# Patient Record
Sex: MALE | Race: WHITE | NOT HISPANIC OR LATINO | Employment: OTHER | RURAL
[De-identification: names, ages, dates, MRNs, and addresses within clinical notes are randomized per-mention and may not be internally consistent; named-entity substitution may affect disease eponyms.]

---

## 2023-08-04 ENCOUNTER — HOSPITAL ENCOUNTER (EMERGENCY)
Facility: HOSPITAL | Age: 73
Discharge: HOME | End: 2023-08-04
Attending: EMERGENCY MEDICINE
Payer: MEDICARE

## 2023-08-04 VITALS
WEIGHT: 200 LBS | HEIGHT: 74 IN | DIASTOLIC BLOOD PRESSURE: 84 MMHG | TEMPERATURE: 98 F | RESPIRATION RATE: 38 BRPM | OXYGEN SATURATION: 96 % | BODY MASS INDEX: 25.67 KG/M2 | SYSTOLIC BLOOD PRESSURE: 129 MMHG | HEART RATE: 71 BPM
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
## 2023-08-04 DIAGNOSIS — M25.511 RIGHT SHOULDER PAIN: ICD-10-CM

## 2023-08-04 DIAGNOSIS — S43.014A ANTERIOR DISLOCATION OF RIGHT SHOULDER, INITIAL ENCOUNTER: Primary | ICD-10-CM

## 2023-08-04 PROCEDURE — 96374 THER/PROPH/DIAG INJ IV PUSH: CPT

## 2023-08-04 PROCEDURE — 96361 HYDRATE IV INFUSION ADD-ON: CPT

## 2023-08-04 PROCEDURE — 25000003 PHARM REV CODE 250: Performed by: EMERGENCY MEDICINE

## 2023-08-04 PROCEDURE — 99284 PR EMERGENCY DEPT VISIT,LEVEL IV: ICD-10-PCS | Mod: ,,, | Performed by: EMERGENCY MEDICINE

## 2023-08-04 PROCEDURE — 63600175 PHARM REV CODE 636 W HCPCS: Performed by: EMERGENCY MEDICINE

## 2023-08-04 PROCEDURE — 99284 EMERGENCY DEPT VISIT MOD MDM: CPT | Mod: 25

## 2023-08-04 PROCEDURE — 99284 EMERGENCY DEPT VISIT MOD MDM: CPT | Mod: ,,, | Performed by: EMERGENCY MEDICINE

## 2023-08-04 PROCEDURE — 96375 TX/PRO/DX INJ NEW DRUG ADDON: CPT

## 2023-08-04 RX ORDER — GABAPENTIN 800 MG/1
800 TABLET ORAL
COMMUNITY

## 2023-08-04 RX ORDER — FENTANYL CITRATE 50 UG/ML
INJECTION, SOLUTION INTRAMUSCULAR; INTRAVENOUS
Status: DISCONTINUED
Start: 2023-08-04 | End: 2023-08-04 | Stop reason: HOSPADM

## 2023-08-04 RX ORDER — ONDANSETRON 2 MG/ML
4 INJECTION INTRAMUSCULAR; INTRAVENOUS
Status: COMPLETED | OUTPATIENT
Start: 2023-08-04 | End: 2023-08-04

## 2023-08-04 RX ORDER — CARBIDOPA AND LEVODOPA 25; 100 MG/1; MG/1
2 TABLET ORAL
COMMUNITY

## 2023-08-04 RX ORDER — PROPOFOL 10 MG/ML
50 VIAL (ML) INTRAVENOUS ONCE
Status: DISCONTINUED | OUTPATIENT
Start: 2023-08-04 | End: 2023-08-04

## 2023-08-04 RX ORDER — FENTANYL CITRATE 50 UG/ML
50 INJECTION, SOLUTION INTRAMUSCULAR; INTRAVENOUS
Status: COMPLETED | OUTPATIENT
Start: 2023-08-04 | End: 2023-08-04

## 2023-08-04 RX ORDER — FENTANYL CITRATE 50 UG/ML
INJECTION, SOLUTION INTRAMUSCULAR; INTRAVENOUS CODE/TRAUMA/SEDATION MEDICATION
Status: COMPLETED | OUTPATIENT
Start: 2023-08-04 | End: 2023-08-04

## 2023-08-04 RX ORDER — MIDAZOLAM HYDROCHLORIDE 5 MG/ML
INJECTION INTRAMUSCULAR; INTRAVENOUS CODE/TRAUMA/SEDATION MEDICATION
Status: COMPLETED | OUTPATIENT
Start: 2023-08-04 | End: 2023-08-04

## 2023-08-04 RX ORDER — SODIUM CHLORIDE 9 MG/ML
INJECTION, SOLUTION INTRAVENOUS
Status: COMPLETED | OUTPATIENT
Start: 2023-08-04 | End: 2023-08-04

## 2023-08-04 RX ORDER — ROPINIROLE 1 MG/1
1 TABLET, FILM COATED ORAL
COMMUNITY
Start: 2023-07-14 | End: 2023-08-13

## 2023-08-04 RX ORDER — CIPROFLOXACIN 500 MG/1
500 TABLET ORAL
COMMUNITY
Start: 2023-07-31 | End: 2023-08-30

## 2023-08-04 RX ORDER — PREDNISONE 10 MG/1
1 TABLET ORAL EVERY MORNING
COMMUNITY

## 2023-08-04 RX ORDER — MIDAZOLAM HYDROCHLORIDE 1 MG/ML
2.5 INJECTION INTRAMUSCULAR; INTRAVENOUS
Status: DISCONTINUED | OUTPATIENT
Start: 2023-08-04 | End: 2023-08-04

## 2023-08-04 RX ORDER — MIDAZOLAM HYDROCHLORIDE 1 MG/ML
INJECTION INTRAMUSCULAR; INTRAVENOUS
Status: DISCONTINUED
Start: 2023-08-04 | End: 2023-08-04 | Stop reason: WASHOUT

## 2023-08-04 RX ADMIN — FENTANYL CITRATE 25 MCG: 50 INJECTION INTRAMUSCULAR; INTRAVENOUS at 12:08

## 2023-08-04 RX ADMIN — FENTANYL CITRATE 45 MCG: 50 INJECTION INTRAMUSCULAR; INTRAVENOUS at 12:08

## 2023-08-04 RX ADMIN — SODIUM CHLORIDE 1000 ML: 9 INJECTION, SOLUTION INTRAVENOUS at 12:08

## 2023-08-04 RX ADMIN — FENTANYL CITRATE 50 MCG: 50 INJECTION, SOLUTION INTRAMUSCULAR; INTRAVENOUS at 12:08

## 2023-08-04 RX ADMIN — MIDAZOLAM HYDROCHLORIDE 2 MG: 5 INJECTION, SOLUTION INTRAMUSCULAR; INTRAVENOUS at 12:08

## 2023-08-04 RX ADMIN — ONDANSETRON HYDROCHLORIDE 4 MG: 2 SOLUTION INTRAMUSCULAR; INTRAVENOUS at 12:08

## 2023-08-04 NOTE — ED PROVIDER NOTES
Encounter Date: 8/4/2023       History   No chief complaint on file.    72-year-old male presents to the emergency department to be evaluated for right upper arm pain.  He tripped and fell about an hour ago and injured his right upper arm.  Denies head injury, headache, neck pain, back pain, chest pain, abdominal pain, loss of consciousness.    The history is provided by the patient.   Fall  The accident occurred 1 to 2 hours ago. Pertinent negatives include no neck pain, no back pain, no paresthesias, no paralysis, no visual change, no fever, no numbness, no abdominal pain, no bowel incontinence, no nausea, no vomiting, no hematuria, no headaches, no hearing loss, no loss of consciousness and no tingling.     Review of patient's allergies indicates:  No Known Allergies  Past Medical History:   Diagnosis Date    Parkinson's disease      No past surgical history on file.  No family history on file.     Review of Systems   Constitutional:  Negative for fever.   Gastrointestinal:  Negative for abdominal pain, bowel incontinence, nausea and vomiting.   Genitourinary:  Negative for hematuria.   Musculoskeletal:  Negative for back pain and neck pain.   Neurological:  Negative for tingling, loss of consciousness, numbness, headaches and paresthesias.   All other systems reviewed and are negative.      Physical Exam     Initial Vitals [08/04/23 1111]   BP Pulse Resp Temp SpO2   112/83 84 16 97.6 °F (36.4 °C) 95 %      MAP       --         Physical Exam    Vitals reviewed.  Constitutional: He appears well-developed and well-nourished.   HENT:   Head: Normocephalic and atraumatic.   Eyes: EOM are normal. Pupils are equal, round, and reactive to light.   Neck: Neck supple.   Cardiovascular:  Normal rate and regular rhythm.           Pulmonary/Chest: Breath sounds normal.   Abdominal: Abdomen is soft. Bowel sounds are normal. He exhibits no distension and no mass. There is no abdominal tenderness. There is no rebound and no  guarding.   Musculoskeletal:         General: Normal range of motion.      Right shoulder: Normal.      Right upper arm: Tenderness present. No swelling, edema, deformity or lacerations.      Right elbow: Normal.      Cervical back: Normal and neck supple.      Thoracic back: Normal.      Lumbar back: Normal.     Neurological: He is alert and oriented to person, place, and time. He has normal strength. GCS score is 15. GCS eye subscore is 4. GCS verbal subscore is 5. GCS motor subscore is 6.   Skin: Skin is warm and dry. Capillary refill takes less than 2 seconds.   Psychiatric: He has a normal mood and affect.         Medical Screening Exam   See Full Note    ED Course   Procedures  Labs Reviewed - No data to display       Imaging Results              X-Ray Shoulder Trauma Right (Final result)  Result time 08/04/23 13:32:27      Final result by Ignacio Corona II, MD (08/04/23 13:32:27)                   Impression:      Successful reduction of dislocation.      Electronically signed by: Ignacio Corona  Date:    08/04/2023  Time:    13:32               Narrative:    EXAMINATION:  XR SHOULDER TRAUMA 3 VIEW RIGHT    CLINICAL HISTORY:  Pain in right shoulder    COMPARISON:  4 August 2023 at 11:54    TECHNIQUE:  XR SHOULDER 2 VIEW RIGHT    FINDINGS:  Previous dislocation reduced to normal alignment.  No other definite changes                                       X-Ray Shoulder Trauma Right (Final result)  Result time 08/04/23 12:09:37      Final result by Wan Harris DO (08/04/23 12:09:37)                   Impression:      Anterior inferior dislocation of the right proximal humerus      Electronically signed by: Wan Harris  Date:    08/04/2023  Time:    12:09               Narrative:    EXAMINATION:  XR SHOULDER TRAUMA 3 VIEW RIGHT    CLINICAL HISTORY:  Pain in right shoulder    TECHNIQUE:  XR SHOULDER TRAUMA 3 VIEW RIGHT    COMPARISON:  8/4/23    FINDINGS:  Anterior inferior dislocation of the  right proximal humerus                                       X-Ray Humerus 2 View Right (Final result)  Result time 08/04/23 12:08:53      Final result by Wan Harris DO (08/04/23 12:08:53)                   Impression:      Anterior/inferior displacement of the proximal right humerus.      Electronically signed by: Wan Harris  Date:    08/04/2023  Time:    12:08               Narrative:    EXAMINATION:  XR HUMERUS 2 VIEW RIGHT    CLINICAL HISTORY:  Unspecified fall, initial encounter    TECHNIQUE:  XR HUMERUS 2 VIEW RIGHT    COMPARISON:  None    FINDINGS:  Poor positioning and motion limits evaluation of the right humerus.  The exam is also limited due to lack of orthogonal views.    No obvious acutely displaced fracture of the right humerus.    Anterior/inferior displacement of the proximal right humerus.                                       Medications   sodium chloride 0.9% bolus 1,000 mL 1,000 mL (0 mLs Intravenous Stopped 8/4/23 1346)   fentaNYL injection 50 mcg (50 mcg Intravenous Given 8/4/23 1229)   ondansetron injection 4 mg (4 mg Intravenous Given 8/4/23 1229)   0.9%  NaCl infusion (0 mL/hr Intravenous Stopped 8/4/23 1335)   midazolam (VERSED) 5 mg/mL injection (2 mg Intravenous Given 8/4/23 1245)   fentaNYL 50 mcg/mL injection (25 mcg Intravenous Given 8/4/23 1252)                 ED Course as of 08/07/23 0600   Fri Aug 04, 2023   1256 Procedure note:  Reduction of shoulder dislocation:  Patient was sedated using Versed and fentanyl after which shoulder was reduced successfully after several attempts using direct traction and abduction and external rotation.  Patient tolerated procedure well.  Reduction was confirmed by postreduction x-ray.  Patient placed in a shoulder immobilizer. [BB]      ED Course User Index  [BB] Dax England MD                Clinical Impression:   Final diagnoses:  [W19.XXXA] Fall  [M25.511] Right shoulder pain  [S43.014A] Anterior dislocation of right shoulder,  initial encounter (Primary)        ED Disposition Condition    Discharge Stable          ED Prescriptions    None       Follow-up Information       Follow up With Specialties Details Why Contact Info    Tra Waller MD Orthopedic Surgery Schedule an appointment as soon as possible for a visit  Follow up in clinic next week for recheck. [address] [facility]  [facility] [address]  [phone]               Dax England MD  08/07/23 0600

## 2023-08-04 NOTE — PROVIDER PROGRESS NOTES - EMERGENCY DEPT.
Encounter Date: 8/4/2023    ED Physician Progress Notes        X-ray shows dislocation.  Care transferred to Dr. England

## 2023-08-04 NOTE — DISCHARGE INSTRUCTIONS
Wear shoulder immobilizer at all times.  Follow up in with orthopedics next week for recheck.  Return to emergency department for any worsen further problems.

## 2023-08-04 NOTE — ED NOTES
Arm sling applied to right arm.  
Pt transferred to [facility] for reduction of right shoulder. Pt hooked up to monitor.  
XR @ bedside.  
denies

## 2023-08-11 ENCOUNTER — HOSPITAL ENCOUNTER (OUTPATIENT)
Dept: RADIOLOGY | Facility: HOSPITAL | Age: 73
Discharge: HOME | End: 2023-08-11
Attending: NURSE PRACTITIONER
Payer: MEDICARE

## 2023-08-11 ENCOUNTER — OFFICE VISIT (OUTPATIENT)
Dept: ORTHOPEDICS | Facility: CLINIC | Age: 73
End: 2023-08-11
Payer: MEDICARE

## 2023-08-11 VITALS — WEIGHT: 200 LBS | HEIGHT: 74 IN | BODY MASS INDEX: 25.67 KG/M2

## 2023-08-11 DIAGNOSIS — S43.004A SHOULDER DISLOCATION, RIGHT, INITIAL ENCOUNTER: ICD-10-CM

## 2023-08-11 DIAGNOSIS — M25.511 ACUTE PAIN OF RIGHT SHOULDER: ICD-10-CM

## 2023-08-11 DIAGNOSIS — M25.511 ACUTE PAIN OF RIGHT SHOULDER: Primary | ICD-10-CM

## 2023-08-11 PROCEDURE — 73030 X-RAY EXAM OF SHOULDER: CPT | Mod: TC,RT

## 2023-08-11 PROCEDURE — 99213 OFFICE O/P EST LOW 20 MIN: CPT | Mod: PBBFAC | Performed by: NURSE PRACTITIONER

## 2023-08-11 PROCEDURE — 73030 X-RAY EXAM OF SHOULDER: CPT | Mod: 26,RT,, | Performed by: RADIOLOGY

## 2023-08-11 PROCEDURE — 73030 XR SHOULDER COMPLETE 2 OR MORE VIEWS RIGHT: ICD-10-PCS | Mod: 26,RT,, | Performed by: RADIOLOGY

## 2023-08-11 PROCEDURE — 99203 PR OFFICE/OUTPT VISIT, NEW, LEVL III, 30-44 MIN: ICD-10-PCS | Mod: S$PBB,,, | Performed by: NURSE PRACTITIONER

## 2023-08-11 PROCEDURE — 99203 OFFICE O/P NEW LOW 30 MIN: CPT | Mod: S$PBB,,, | Performed by: NURSE PRACTITIONER

## 2023-08-11 RX ORDER — CYCLOBENZAPRINE HCL 5 MG
5 TABLET ORAL 2 TIMES DAILY PRN
Qty: 30 TABLET | Refills: 0 | Status: SHIPPED | OUTPATIENT
Start: 2023-08-11 | End: 2023-08-11

## 2023-08-11 RX ORDER — METAXALONE 800 MG/1
800 TABLET ORAL 2 TIMES DAILY PRN
Qty: 30 TABLET | Refills: 0 | Status: SHIPPED | OUTPATIENT
Start: 2023-08-11 | End: 2023-08-26

## 2023-08-11 NOTE — PROGRESS NOTES
HPI:   Abebe Kwok is a pleasant 72 y.o. patient who reports to clinic for evaluation of right shoulder pain.  Patient had a fall and was seen in the ED on August 4.  He was seen for right shoulder pain, had a right shoulder dislocation.  Dislocation was reduced in the ED.  He did have x-rays after reduction to confirm.  He does have some bruising and swelling to the shoulder.  He has been in a sling since his ER visit on the 4th.  He does have a good bit of soreness and stiffness to the arm.  He has not tried lifting much.  He does have Parkinson's.  He is accompanied by his son today..   His biggest complaint is having muscle spasms in the shoulder and upper arm.  Reports he has not had a previous l dislocation before this fall.  He has not had a dislocation since.  Injury onset and description: fall  Patient's occupation:   This is not a work related injury.   This injury has been non-responsive to conservative care. The pain is worse with repetitive use, and strenuous activity is very difficult.  his pain improves with rest.   PAST MEDICAL HISTORY:   Past Medical History:   Diagnosis Date    Parkinson's disease      PAST SURGICAL HISTORY:   History reviewed. No pertinent surgical history.  MEDICATIONS:    Current Outpatient Medications:     carbidopa-levodopa  mg (SINEMET)  mg per tablet, Take 2 tablets by mouth., Disp: , Rfl:     ciprofloxacin HCl (CIPRO) 500 MG tablet, Take 500 mg by mouth., Disp: , Rfl:     gabapentin (NEURONTIN) 800 MG tablet, Take 800 mg by mouth., Disp: , Rfl:     metaxalone (SKELAXIN) 800 MG tablet, Take 1 tablet (800 mg total) by mouth 2 (two) times daily as needed for Pain., Disp: 30 tablet, Rfl: 0    predniSONE (DELTASONE) 10 MG tablet, Take 1 tablet by mouth every morning., Disp: , Rfl:     rivaroxaban (XARELTO) 15 mg Tab, Take 1 tablet by mouth once daily., Disp: , Rfl:     rOPINIRole (REQUIP) 1 MG tablet, Take 1 mg by mouth., Disp: , Rfl:   ALLERGIES:   Review  "of patient's allergies indicates:  No Known Allergies      PHYSICAL EXAM:  VITAL SIGNS: Ht 6' 2" (1.88 m)   Wt 90.7 kg (200 lb)   BMI 25.68 kg/m²   General: Well-developed well-nourished 72 y.o. malein no acute distress;Cardiovascular: Regular rhythm by palpation of distal pulse, normal color and temperature, no concerning varicosities on symptomatic side Lungs: No labored breathing or wheezing appreciated Neuro: Alert and oriented ×3 Psychiatric: well oriented to person, place and time, demonstrates normal mood and affect Skin: No rashes, lesions or ulcers, normal temperature, turgor, and texture on uninvolved extremity    General    Constitutional: He is oriented to person, place, and time. He appears well-developed and well-nourished. No distress.   HENT:   Head: Normocephalic and atraumatic.   Nose: Nose normal.   Eyes: Pupils are equal, round, and reactive to light.   Neck: Neck supple.   Cardiovascular:  Normal rate and intact distal pulses.            Pulmonary/Chest: Effort normal. No respiratory distress.   Abdominal: Soft. He exhibits no distension. There is no abdominal tenderness.   Neurological: He is alert and oriented to person, place, and time. He has normal reflexes. No cranial nerve deficit. He exhibits normal muscle tone.   Psychiatric: He has a normal mood and affect. His behavior is normal. Judgment normal.         Right Shoulder Exam     Inspection/Observation   Swelling: present  Bruising: present  Scars: absent    Tenderness   The patient is tender to palpation of the acromioclavicular joint and biceps tendon.    Range of Motion   Active abduction:  abnormal   Passive abduction:  abnormal   Forward Flexion:  abnormal   External Rotation 0 degrees:  normal     Tests & Signs   Drop arm: negative  Active Compression Test (Electra's Sign): positive  Yergason's Test: positive  Speed's Test: positive  Jerk Test: postive    Other   Sensation: normal    Muscle Strength   Right Upper Extremity "   Supraspinatus: 4/5   Subscapularis: 4/5     Reflexes     Left Side  Left Redmond reflex: He has a good bit of bruising to the right shoulder an upper arm.    Vascular Exam     Right Pulses      Radial:                    2+      IMAGING:  X-ray Shoulder 2 or More Views Right    Result Date: 8/11/2023  EXAMINATION: XR SHOULDER COMPLETE 2 OR MORE VIEWS RIGHT CLINICAL HISTORY: Pain in right shoulder COMPARISON: 4 August 2023 TECHNIQUE: XR SHOULDER 2 VIEWS RIGHT FINDINGS: No evidence of fracture seen.  The alignment of the joints appears normal.  Mild shoulder degenerative change is present.  No soft tissue abnormality is seen.     Mild shoulder osteoarthrosis. Electronically signed by: Ignacio Corona Date:    08/11/2023 Time:    09:11    X-Ray Shoulder Trauma Right    Result Date: 8/4/2023  EXAMINATION: XR SHOULDER TRAUMA 3 VIEW RIGHT CLINICAL HISTORY: Pain in right shoulder COMPARISON: 4 August 2023 at 11:54 TECHNIQUE: XR SHOULDER 2 VIEW RIGHT FINDINGS: Previous dislocation reduced to normal alignment.  No other definite changes     Successful reduction of dislocation. Electronically signed by: Ignacio Corona Date:    08/04/2023 Time:    13:32    X-Ray Shoulder Trauma Right    Result Date: 8/4/2023  EXAMINATION: XR SHOULDER TRAUMA 3 VIEW RIGHT CLINICAL HISTORY: Pain in right shoulder TECHNIQUE: XR SHOULDER TRAUMA 3 VIEW RIGHT COMPARISON: 8/4/23 FINDINGS: Anterior inferior dislocation of the right proximal humerus     Anterior inferior dislocation of the right proximal humerus Electronically signed by: Wan Harris Date:    08/04/2023 Time:    12:09        ASSESSMENT:      ICD-10-CM ICD-9-CM   1. Acute pain of right shoulder  M25.511 719.41   2. Shoulder dislocation, right, initial encounter  S43.004A 831.00       PLAN:     -Findings and treatment options were discussed with the patient  -All questions answered  We will continue his arm sling at this time.  He does have x-rays today.  Return to clinic in 2  weeks, we will repeat x-rays.  We did put him on Skelaxin 800 mg p.o. b.i.d. as needed.    There are no Patient Instructions on file for this visit.  Orders Placed This Encounter   Procedures    X-ray Shoulder 2 or More Views Right     Standing Status:   Future     Number of Occurrences:   1     Standing Expiration Date:   8/11/2024     Order Specific Question:   May the Radiologist modify the order per protocol to meet the clinical needs of the patient?     Answer:   Yes     Order Specific Question:   Release to patient     Answer:   Immediate     Procedures

## 2023-08-11 NOTE — LETTER
August 11, 2023      [facility]  [address] [place] [id number]  Phone: [phone]  Fax: [phone]       Patient: Abebe Kwok   YOB: 1950  Date of Visit: 08/11/2023    To Whom It May Concern:    Flower Kwok  was at [facility] on 08/11/2023. The patient may return to work/school on 08/11/20232 with no restrictions. Patient was accompanied by his son. If you have any questions or concerns, or if I can be of further assistance, please do not hesitate to contact me.    Sincerely,    FRANSICO [place]

## 2023-08-21 DIAGNOSIS — M25.511 ACUTE PAIN OF RIGHT SHOULDER: Primary | ICD-10-CM

## 2023-08-21 RX ORDER — CYCLOBENZAPRINE HCL 5 MG
TABLET ORAL
Qty: 30 TABLET | Refills: 0 | OUTPATIENT
Start: 2023-08-21

## 2023-08-21 NOTE — TELEPHONE ENCOUNTER
Patient states that he has taken all his Flexeril and wants a refill. Maria A sent a prescription for Skelaxin to his pharmacy on 8/11/23. She prefers him to take this rather than the Flexeril because it will make him less sleepy. He has not picked up that medication at this time, but states he will check with the pharmacy.

## 2023-08-31 ENCOUNTER — HOSPITAL ENCOUNTER (OUTPATIENT)
Dept: RADIOLOGY | Facility: HOSPITAL | Age: 73
Discharge: HOME | End: 2023-08-31
Attending: ORTHOPAEDIC SURGERY
Payer: MEDICARE

## 2023-08-31 ENCOUNTER — OFFICE VISIT (OUTPATIENT)
Dept: ORTHOPEDICS | Facility: CLINIC | Age: 73
End: 2023-08-31
Payer: MEDICARE

## 2023-08-31 DIAGNOSIS — M25.511 ACUTE PAIN OF RIGHT SHOULDER: Primary | ICD-10-CM

## 2023-08-31 DIAGNOSIS — M25.511 ACUTE PAIN OF RIGHT SHOULDER: ICD-10-CM

## 2023-08-31 PROCEDURE — 99213 OFFICE O/P EST LOW 20 MIN: CPT | Mod: S$PBB,,, | Performed by: ORTHOPAEDIC SURGERY

## 2023-08-31 PROCEDURE — 99213 PR OFFICE/OUTPT VISIT, EST, LEVL III, 20-29 MIN: ICD-10-PCS | Mod: S$PBB,,, | Performed by: ORTHOPAEDIC SURGERY

## 2023-08-31 PROCEDURE — 73030 X-RAY EXAM OF SHOULDER: CPT | Mod: TC,RT

## 2023-08-31 PROCEDURE — 73030 X-RAY EXAM OF SHOULDER: CPT | Mod: 26,RT,, | Performed by: ORTHOPAEDIC SURGERY

## 2023-08-31 PROCEDURE — 73030 XR SHOULDER COMPLETE 2 OR MORE VIEWS RIGHT: ICD-10-PCS | Mod: 26,RT,, | Performed by: ORTHOPAEDIC SURGERY

## 2023-08-31 PROCEDURE — 99212 OFFICE O/P EST SF 10 MIN: CPT | Mod: PBBFAC | Performed by: ORTHOPAEDIC SURGERY

## 2023-08-31 NOTE — PROGRESS NOTES
CC:   Chief Complaint   Patient presents with    Right Shoulder - Injury     RT SHOULDER DISLOCATION 8/4- SAW LORE 8/11        PREVIOUS INFO:  Lore Casarez FNP   Nurse Practitioner  Specialty:  Family Medicine  Progress Notes      Signed  Encounter Date:  8/11/2023  Creation Time:  8/11/2023 11:18 AM                                                                                                                                                                                                                                                                                    HPI:   Abebe Kwok is a pleasant 72 y.o. patient who reports to clinic for evaluation of right shoulder pain.  Patient had a fall and was seen in the ED on August 4.  He was seen for right shoulder pain, had a right shoulder dislocation.  Dislocation was reduced in the ED.  He did have x-rays after reduction to confirm.  He does have some bruising and swelling to the shoulder.  He has been in a sling since his ER visit on the 4th.  He does have a good bit of soreness and stiffness to the arm.  He has not tried lifting much.  He does have Parkinson's.  He is accompanied by his son today..   His biggest complaint is having muscle spasms in the shoulder and upper arm.  Reports he has not had a previous l dislocation before this fall.  He has not had a dislocation since.  Injury onset and description: fall  Patient's occupation:   This is not a work related injury.   This injury has been non-responsive to conservative care. The pain is worse with repetitive use, and strenuous activity is very difficult.  his pain improves with rest.             HISTORY:   8/31/2023    Abebe Kwok  is a 72 y.o. patient with underlying Parkinson sustained a right shoulder dislocation on August 4th 4 weeks ago.  He was reduced by the emergency room physician  He has severe Parkinson's he is in a wheelchair really nonambulatory has  significant motion really do not think he is a candidate for MRI or surgery and he would want surgery      PAST MEDICAL HISTORY:   Past Medical History:   Diagnosis Date    Parkinson's disease           PAST SURGICAL HISTORY: No past surgical history on file.       ALLERGIES: Review of patient's allergies indicates:  No Known Allergies     MEDICATIONS :    Current Outpatient Medications:     carbidopa-levodopa  mg (SINEMET)  mg per tablet, Take 2 tablets by mouth., Disp: , Rfl:     gabapentin (NEURONTIN) 800 MG tablet, Take 800 mg by mouth., Disp: , Rfl:     predniSONE (DELTASONE) 10 MG tablet, Take 1 tablet by mouth every morning., Disp: , Rfl:     rivaroxaban (XARELTO) 15 mg Tab, Take 1 tablet by mouth once daily., Disp: , Rfl:     rOPINIRole (REQUIP) 1 MG tablet, Take 1 mg by mouth., Disp: , Rfl:      SOCIAL HISTORY:   Social History     Socioeconomic History    Marital status:    Tobacco Use    Smoking status: Never    Smokeless tobacco: Never        ROS    FAMILY HISTORY: No family history on file.       PHYSICAL EXAM: There were no vitals filed for this visit.            There is no height or weight on file to calculate BMI.     In general, this is a well-developed, well-nourished male . The patient is alert, oriented and cooperative.      HEENT:  Normocephalic, atraumatic.  Extraocular movements are intact bilaterally.  The oropharynx is benign.       NECK:  Nontender with good range of motion.      PULMONARY: Respirations are even and non-labored.       CARDIOVASCULAR: Pulses regular by peripheral palpation.       ABDOMEN:  Soft, non-tender, non-distended.        EXTREMITIES:  He is moving his arm least 90° forward flexion abduction fairly freely    Ortho Exam      RADIOGRAPHIC FINDINGS:  Right shoulder AP and transscapular lateral osteophytes present joints congruent reduced no fracture dislocation      .      IMPRESSION:  He has been in a sling for 4 weeks he is not in the sling right  now what we are going to release him see him back on a p.r.n. basis    PLAN:  As above  There are no Patient Instructions on file for this visit.      No follow-ups on file.         Denis Titus III      (Subject to voice recognition error, transcription service not allowed)

## 2023-09-18 DIAGNOSIS — M25.511 ACUTE PAIN OF RIGHT SHOULDER: Primary | ICD-10-CM

## 2023-09-19 ENCOUNTER — HOSPITAL ENCOUNTER (OUTPATIENT)
Dept: RADIOLOGY | Facility: HOSPITAL | Age: 73
Discharge: HOME | End: 2023-09-19
Attending: ORTHOPAEDIC SURGERY
Payer: MEDICARE

## 2023-09-19 ENCOUNTER — OFFICE VISIT (OUTPATIENT)
Dept: ORTHOPEDICS | Facility: CLINIC | Age: 73
End: 2023-09-19
Payer: MEDICARE

## 2023-09-19 DIAGNOSIS — M25.511 ACUTE PAIN OF RIGHT SHOULDER: ICD-10-CM

## 2023-09-19 DIAGNOSIS — Z09 FOLLOW-UP EXAMINATION, FOLLOWING OTHER SURGERY: Primary | ICD-10-CM

## 2023-09-19 PROCEDURE — 99212 OFFICE O/P EST SF 10 MIN: CPT | Mod: S$PBB,,, | Performed by: ORTHOPAEDIC SURGERY

## 2023-09-19 PROCEDURE — 99212 OFFICE O/P EST SF 10 MIN: CPT | Mod: PBBFAC | Performed by: ORTHOPAEDIC SURGERY

## 2023-09-19 PROCEDURE — 73030 X-RAY EXAM OF SHOULDER: CPT | Mod: TC,RT

## 2023-09-19 PROCEDURE — 73030 XR SHOULDER COMPLETE 2 OR MORE VIEWS RIGHT: ICD-10-PCS | Mod: 26,RT,, | Performed by: ORTHOPAEDIC SURGERY

## 2023-09-19 PROCEDURE — 73030 X-RAY EXAM OF SHOULDER: CPT | Mod: 26,RT,, | Performed by: ORTHOPAEDIC SURGERY

## 2023-09-19 PROCEDURE — 99212 PR OFFICE/OUTPT VISIT, EST, LEVL II, 10-19 MIN: ICD-10-PCS | Mod: S$PBB,,, | Performed by: ORTHOPAEDIC SURGERY

## 2023-09-19 NOTE — PROGRESS NOTES
CC:   Chief Complaint   Patient presents with    Follow-up      F/U RT SHOULDER DISLOCATION 8/4        PREVIOUS INFO:  August 11, 2023     Maria A Leida     HPI:   Abebe Kwok is a pleasant 72 y.o. patient who reports to clinic for evaluation of right shoulder pain.  Patient had a fall and was seen in the ED on August 4.  He was seen for right shoulder pain, had a right shoulder dislocation.  Dislocation was reduced in the ED.  He did have x-rays after reduction to confirm.  He does have some bruising and swelling to the shoulder.  He has been in a sling since his ER visit on the 4th.  He does have a good bit of soreness and stiffness to the arm.  He has not tried lifting much.  He does have Parkinson's.  He is accompanied by his son today..   His biggest complaint is having muscle spasms in the shoulder and upper arm.  Reports he has not had a previous l dislocation before this fall.  He has not had a dislocation since.  Injury onset and description: fall  Patient's occupation:   This is not a work related injury.   This injury has been non-responsive to conservative care. The pain is worse with repetitive use, and strenuous activity is very difficult.  his pain improves with rest.                HISTORY:   8/31/2023    Abebe Kwok  is a 72 y.o. patient with underlying Parkinson sustained a right shoulder dislocation on August 4th 4 weeks ago.  He was reduced by the emergency room physician  He has severe Parkinson's he is in a wheelchair really nonambulatory has significant motion really do not think he is a candidate for MRI or surgery and he would want surgery         HISTORY:   9/19/2023    Abebe Kwok  is a 72 y.o. status post right shoulder dislocation was reduced by the emergency room August 4, 2023,   6 weeks out patient has severe Parkinson significant amount of involuntary motions is wheelchair bound.      PAST MEDICAL HISTORY:   Past Medical History:   Diagnosis Date     Parkinson's disease           PAST SURGICAL HISTORY: No past surgical history on file.       ALLERGIES: Review of patient's allergies indicates:  No Known Allergies     MEDICATIONS :    Current Outpatient Medications:     carbidopa-levodopa  mg (SINEMET)  mg per tablet, Take 2 tablets by mouth., Disp: , Rfl:     gabapentin (NEURONTIN) 800 MG tablet, Take 800 mg by mouth., Disp: , Rfl:     predniSONE (DELTASONE) 10 MG tablet, Take 1 tablet by mouth every morning., Disp: , Rfl:     rivaroxaban (XARELTO) 15 mg Tab, Take 1 tablet by mouth once daily., Disp: , Rfl:     rOPINIRole (REQUIP) 1 MG tablet, Take 1 mg by mouth., Disp: , Rfl:      SOCIAL HISTORY:   Social History     Socioeconomic History    Marital status:    Tobacco Use    Smoking status: Never    Smokeless tobacco: Never        ROS    FAMILY HISTORY: No family history on file.       PHYSICAL EXAM: There were no vitals filed for this visit.            There is no height or weight on file to calculate BMI.     In general, this is a well-developed, well-nourished male . The patient is alert, oriented and cooperative.      HEENT:  Normocephalic, atraumatic.  Extraocular movements are intact bilaterally.  The oropharynx is benign.       NECK:  Nontender with good range of motion.      PULMONARY: Respirations are even and non-labored.       CARDIOVASCULAR: Pulses regular by peripheral palpation.       ABDOMEN:  Soft, non-tender, non-distended.        EXTREMITIES:  Patient has definitely weakness on picking up his right arm is significant amount of involuntary motions of his arms hands deformities of his fingers.    Ortho Exam      RADIOGRAPHIC FINDINGS:  Right shoulder AP transscapular lateral there is degenerative changes of the AC joint glenohumeral joints reduced no fracture or dislocation appreciated      .      IMPRESSION:  Suspect he does not rotator cuff tear he does not appear to be a she has surgical option for repair this point he  agrees him in his son we are going to see him back p.r.n.    PLAN:  P.r.n.  There are no Patient Instructions on file for this visit.      No follow-ups on file.         Denis Titus III      (Subject to voice recognition error, transcription service not allowed)

## 2024-01-21 ENCOUNTER — HOSPITAL ENCOUNTER (EMERGENCY)
Facility: HOSPITAL | Age: 74
Discharge: HOME | End: 2024-01-21
Payer: MEDICARE

## 2024-01-21 VITALS
DIASTOLIC BLOOD PRESSURE: 68 MMHG | RESPIRATION RATE: 19 BRPM | HEIGHT: 74 IN | WEIGHT: 196 LBS | OXYGEN SATURATION: 94 % | HEART RATE: 72 BPM | TEMPERATURE: 99 F | BODY MASS INDEX: 25.15 KG/M2 | SYSTOLIC BLOOD PRESSURE: 108 MMHG

## 2024-01-21 DIAGNOSIS — R53.83 FATIGUE: ICD-10-CM

## 2024-01-21 DIAGNOSIS — U07.1 COVID-19: Primary | ICD-10-CM

## 2024-01-21 DIAGNOSIS — U07.1 COVID-19 VIRUS DETECTED: ICD-10-CM

## 2024-01-21 LAB
FLUAV AG UPPER RESP QL IA.RAPID: NEGATIVE
FLUBV AG UPPER RESP QL IA.RAPID: NEGATIVE
SARS-COV-2 RDRP RESP QL NAA+PROBE: POSITIVE

## 2024-01-21 PROCEDURE — 99283 EMERGENCY DEPT VISIT LOW MDM: CPT | Mod: 25

## 2024-01-21 PROCEDURE — 87502 INFLUENZA DNA AMP PROBE: CPT | Performed by: NURSE PRACTITIONER

## 2024-01-21 PROCEDURE — 87635 SARS-COV-2 COVID-19 AMP PRB: CPT | Performed by: NURSE PRACTITIONER

## 2024-01-21 PROCEDURE — 99283 EMERGENCY DEPT VISIT LOW MDM: CPT | Mod: ,,, | Performed by: NURSE PRACTITIONER

## 2024-01-21 NOTE — DISCHARGE INSTRUCTIONS
Quarantine 5 days from symptoms onset. Drink plenty of fluids. Return to the ed with any worsening symptoms.

## 2024-01-21 NOTE — ED PROVIDER NOTES
Encounter Date: 1/21/2024       History     Chief Complaint   Patient presents with    Fatigue     Pt c/o fatigue and not being able to walk, family has had cold like symptoms.     Pt presents with fatigue and cold symptoms since Thursday. Pt is in a wheelchair and has a history of parkinsons.       Review of patient's allergies indicates:  No Known Allergies  Past Medical History:   Diagnosis Date    Parkinson's disease      No past surgical history on file.  No family history on file.  Social History     Tobacco Use    Smoking status: Never    Smokeless tobacco: Never     Review of Systems   Constitutional:  Positive for fatigue. Negative for fever.   HENT:  Positive for sinus pressure. Negative for sore throat.    Respiratory:  Positive for cough. Negative for shortness of breath.    Cardiovascular:  Negative for chest pain.   Gastrointestinal:  Negative for nausea.   Genitourinary:  Negative for dysuria.   Musculoskeletal:  Negative for back pain.   Skin:  Negative for rash.   Neurological:  Negative for weakness.   Hematological:  Does not bruise/bleed easily.   Psychiatric/Behavioral:  Negative for behavioral problems.        Physical Exam     Initial Vitals [01/21/24 1055]   BP Pulse Resp Temp SpO2   108/68 72 19 98.7 °F (37.1 °C) (!) 94 %      MAP       --         Physical Exam    Nursing note and vitals reviewed.  Constitutional: He appears well-developed.   HENT:   Right Ear: External ear normal.   Left Ear: External ear normal.   Nose: Nose normal.   Mouth/Throat: Oropharynx is clear and moist.   Eyes: Conjunctivae are normal. Pupils are equal, round, and reactive to light.   Cardiovascular:  Normal rate and regular rhythm.           Pulmonary/Chest: Breath sounds normal.     Neurological: He is alert and oriented to person, place, and time.   Psychiatric: He has a normal mood and affect. Thought content normal.         Medical Screening Exam   See Full Note    ED Course   Procedures  Labs Reviewed    SARS-COV-2 RNA AMPLIFICATION, QUAL - Abnormal; Notable for the following components:       Result Value    SARS COV-2 MOLECULAR Positive (*)     All other components within normal limits   RAPID INFLUENZA A/B - Normal          Imaging Results              X-Ray Chest 1 View (Final result)  Result time 01/21/24 11:23:18   Procedure changed from X-Ray Chest PA And Lateral     Final result by Beto Dumont DO (01/21/24 11:23:18)                   Impression:      No acute cardiopulmonary process demonstrated.    Point of Service: [facility]      Electronically signed by: Beto Dumont  Date:    01/21/2024  Time:    11:23               Narrative:    EXAMINATION:  XR CHEST 1 VIEW    CLINICAL HISTORY:  Other fatiguefatigue;    COMPARISON:  None    TECHNIQUE:  Frontal view/views of the chest.    FINDINGS:  Heart size appears within normal limits.  Chronic change of the lungs without focal consolidation, pleural effusion, or pneumothorax.  Electronic device projects over the left lung apex with electrodes extending superiorly toward the left neck base.  Mild S-shaped curvature of the spine.                                       Medications - No data to display  Medical Decision Making  Pt presents with fatigue and cold symptoms since Thursday. Pt is in a wheelchair and has a history of parkinsons.       Amount and/or Complexity of Data Reviewed  Labs:      Details: Covid is positive   Radiology: ordered.     Details: Chest xray is normal    Risk  Risk Details: Pt is discharged home in stable condition with diagnosis of covid.                                      Clinical Impression:   Final diagnoses:  [R53.83] Fatigue  [U07.1] COVID-19 (Primary)        ED Disposition Condition    Discharge Stable          ED Prescriptions    None       Follow-up Information    None          Yvette Fernandez, FRANSICO  01/21/24 9483

## 2024-03-18 ENCOUNTER — HOSPITAL ENCOUNTER (EMERGENCY)
Facility: HOSPITAL | Age: 74
Discharge: HOME | End: 2024-03-18
Payer: MEDICARE

## 2024-03-18 VITALS
TEMPERATURE: 99 F | WEIGHT: 195 LBS | SYSTOLIC BLOOD PRESSURE: 110 MMHG | BODY MASS INDEX: 26.41 KG/M2 | DIASTOLIC BLOOD PRESSURE: 62 MMHG | HEART RATE: 85 BPM | RESPIRATION RATE: 17 BRPM | OXYGEN SATURATION: 92 % | HEIGHT: 72 IN

## 2024-03-18 DIAGNOSIS — R52 PAIN: ICD-10-CM

## 2024-03-18 DIAGNOSIS — S02.2XXA CLOSED FRACTURE OF NASAL BONE, INITIAL ENCOUNTER: ICD-10-CM

## 2024-03-18 DIAGNOSIS — S01.81XA FACIAL LACERATION, INITIAL ENCOUNTER: Primary | ICD-10-CM

## 2024-03-18 PROCEDURE — 99284 EMERGENCY DEPT VISIT MOD MDM: CPT | Mod: 25,,,

## 2024-03-18 PROCEDURE — 12011 RPR F/E/E/N/L/M 2.5 CM/<: CPT | Mod: ,,,

## 2024-03-18 PROCEDURE — 99285 EMERGENCY DEPT VISIT HI MDM: CPT | Mod: 25

## 2024-03-18 PROCEDURE — 25000003 PHARM REV CODE 250

## 2024-03-18 PROCEDURE — G0390 TRAUMA RESPONS W/HOSP CRITI: HCPCS | Mod: TRAUMA2

## 2024-03-18 PROCEDURE — 90471 IMMUNIZATION ADMIN: CPT

## 2024-03-18 PROCEDURE — 90715 TDAP VACCINE 7 YRS/> IM: CPT

## 2024-03-18 PROCEDURE — 12011 RPR F/E/E/N/L/M 2.5 CM/<: CPT

## 2024-03-18 PROCEDURE — 63600175 PHARM REV CODE 636 W HCPCS

## 2024-03-18 RX ORDER — TRAMADOL HYDROCHLORIDE 50 MG/1
50 TABLET ORAL EVERY 6 HOURS PRN
COMMUNITY

## 2024-03-18 RX ORDER — TAMSULOSIN HYDROCHLORIDE 0.4 MG/1
0.8 CAPSULE ORAL DAILY
COMMUNITY

## 2024-03-18 RX ORDER — LIDOCAINE HYDROCHLORIDE 10 MG/ML
5 INJECTION, SOLUTION EPIDURAL; INFILTRATION; INTRACAUDAL; PERINEURAL
Status: COMPLETED | OUTPATIENT
Start: 2024-03-18 | End: 2024-03-18

## 2024-03-18 RX ORDER — FAMOTIDINE 20 MG/1
20 TABLET, FILM COATED ORAL DAILY
COMMUNITY

## 2024-03-18 RX ADMIN — TETANUS TOXOID, REDUCED DIPHTHERIA TOXOID AND ACELLULAR PERTUSSIS VACCINE, ADSORBED 0.5 ML: 5; 2.5; 8; 8; 2.5 SUSPENSION INTRAMUSCULAR at 04:03

## 2024-03-18 RX ADMIN — LIDOCAINE HYDROCHLORIDE 50 MG: 10 SOLUTION INTRAVENOUS at 04:03

## 2024-03-18 RX ADMIN — BACITRACIN ZINC, NEOMYCIN SULFATE , POLYMYXIN B SULFATE. 1 EACH: 400; 3.5; 5 OINTMENT TOPICAL at 04:03

## 2024-03-18 NOTE — DISCHARGE INSTRUCTIONS
Elevate left knee apply ice as needed for swelling, take Motrin or Tylenol as needed for pain, sutures to bridge of nose out in 5 days, follow up with local PCP as needed, return to the emergency department if your symptoms worsen.    The examination and treatment you have received in the Emergency Department today have been rendered on an emergency basis only and are not intended to be a substitute for an effort to provide complete medical care. You should contact your follow-up physician as it is important that you let him or her check you and report any new or remaining problems since it is impossible to recognize and treat all elements of an injury or illness in a single emergency care center visit.

## 2024-03-18 NOTE — ED PROVIDER NOTES
Encounter Date: 3/18/2024       History     Chief Complaint   Patient presents with    Fall    Facial Laceration     Patient is a 74 y/o  male with a PMHx of Parkinson's Disease presents to the ED via POV with c/o laceration to bridge of nose s/p fall. Patient is also complaining of right knee pain. Patient denies any LOC.  Bravo trauma activated due to fall on blood thinners with injuries.     The history is provided by the patient.   Fall  The accident occurred just prior to arrival. The fall occurred while walking. He fell from a height of 1 to 2 ft. He landed on Grass. The point of impact was the face. The pain is present in the face. The pain is at a severity of 6/10. He was Ambulatory at the scene. There was No entrapment after the fall. There was No drug use involved in the accident. There was No alcohol use involved in the accident. Associated symptoms include headaches. Pertinent negatives include no neck pain, no back pain, no paresthesias, no visual change, no fever, no numbness, no bowel incontinence, no nausea, no vomiting, no hearing loss and no loss of consciousness.     Review of patient's allergies indicates:  No Known Allergies  Past Medical History:   Diagnosis Date    Parkinson's disease     Presence of neurostimulator      History reviewed. No pertinent surgical history.  History reviewed. No pertinent family history.  Social History     Tobacco Use    Smoking status: Never    Smokeless tobacco: Never     Review of Systems   Constitutional:  Negative for fever.   HENT: Negative.     Eyes: Negative.    Respiratory: Negative.     Cardiovascular: Negative.    Gastrointestinal: Negative.  Negative for bowel incontinence, nausea and vomiting.   Endocrine: Negative.    Genitourinary: Negative.    Musculoskeletal:  Negative for back pain and neck pain.   Skin:  Positive for wound.        2 cm laceration noted to the bridge of nose    Neurological:  Positive for headaches. Negative for loss of  consciousness, numbness and paresthesias.       Physical Exam     Initial Vitals [03/18/24 1601]   BP Pulse Resp Temp SpO2   110/62 85 17 98.7 °F (37.1 °C) (!) 92 %      MAP       --         Physical Exam    Nursing note and vitals reviewed.  Constitutional: Vital signs are normal. He appears well-developed and well-nourished. He is cooperative. He appears ill.   HENT:   Head: Normocephalic and atraumatic.   Right Ear: Hearing, tympanic membrane, external ear and ear canal normal.   Left Ear: Hearing, tympanic membrane, external ear and ear canal normal.   Nose: Nose lacerations and sinus tenderness present. No mucosal edema, rhinorrhea, nasal deformity, septal deviation or nasal septal hematoma. No epistaxis.  No foreign bodies. Right sinus exhibits no maxillary sinus tenderness and no frontal sinus tenderness. Left sinus exhibits no maxillary sinus tenderness and no frontal sinus tenderness.       Neck: Trachea normal and phonation normal. Neck supple. No thyroid mass and no thyromegaly present.   Normal range of motion.   Full passive range of motion without pain.     Cardiovascular:  Normal rate, regular rhythm, S1 normal, S2 normal, normal heart sounds, intact distal pulses and normal pulses.     Exam reveals no gallop, no S3, no S4, no distant heart sounds and no friction rub.       No murmur heard.  No systolic murmur is present.  No diastolic murmur is present.  Pulmonary/Chest: Effort normal and breath sounds normal.   Musculoskeletal:      Cervical back: Full passive range of motion without pain, normal range of motion and neck supple.     Lymphadenopathy:     He has no cervical adenopathy.     He has no axillary adenopathy.   Neurological: He is alert.   Skin: Skin is warm and dry. Capillary refill takes less than 2 seconds. Laceration noted.        Psychiatric: He has a normal mood and affect. His speech is normal and behavior is normal. Judgment and thought content normal. Cognition and memory are  normal.         Medical Screening Exam   See Full Note    ED Course   Lac Repair    Date/Time: 3/18/2024 4:43 PM    Performed by: Marcin Mayfield FNP  Authorized by: Marcin Mayfield FNP    Consent:     Consent obtained:  Emergent situation and verbal    Risks discussed:  Infection, need for additional repair, pain, poor cosmetic result and poor wound healing  Universal protocol:     Procedure explained and questions answered to patient or proxy's satisfaction: yes      Relevant documents present and verified: yes      Test results available: no      Imaging studies available: yes      Required blood products, implants, devices, and special equipment available: no      Site/side marked: no      Immediately prior to procedure, a time out was called: no      Patient identity confirmed:  Verbally with patient and hospital-assigned identification number  Anesthesia:     Anesthesia method:  Local infiltration    Local anesthetic:  Lidocaine 1% w/o epi  Laceration details:     Location:  Face    Face location:  Nose    Length (cm):  2    Depth (mm):  1  Pre-procedure details:     Preparation:  Patient was prepped and draped in usual sterile fashion and imaging obtained to evaluate for foreign bodies  Exploration:     Limited defect created (wound extended): no      Hemostasis achieved with:  Direct pressure    Imaging outcome: foreign body not noted      Contaminated: no    Treatment:     Area cleansed with:  Povidone-iodine and saline    Amount of cleaning:  Standard    Irrigation solution:  Sterile saline    Debridement:  None    Undermining:  None    Scar revision: no    Skin repair:     Repair method:  Sutures    Suture size:  4-0    Suture material:  Nylon    Number of sutures:  3  Approximation:     Approximation:  Close  Repair type:     Repair type:  Simple  Post-procedure details:     Dressing:  Antibiotic ointment and non-adherent dressing    Procedure completion:  Tolerated    Labs Reviewed - No data to  display       Imaging Results              CT Maxillofacial Without Contrast (Final result)  Result time 03/18/24 16:45:33      Final result by Jaswant Garvey MD (03/18/24 16:45:33)                   Impression:      No acute maxillofacial fracture.  Soft tissue laceration at the nasal bridge.      Electronically signed by: Jaswant Garvey  Date:    03/18/2024  Time:    16:45               Narrative:    EXAMINATION:  CT MAXILLOFACIAL WITHOUT CONTRAST    CLINICAL HISTORY:  Maxillofacial pain;s/p fall;    TECHNIQUE:  Low dose axial images, sagittal and coronal reformations were obtained through the face.  Contrast was not administered.    The CT examination was performed using one or more of the following dose reduction techniques: Automated exposure control, adjustment of the mA and kV according to patient's size, use of acute or iterative reconstruction techniques.    COMPARISON:  None    FINDINGS:  Partially imaged deep brain stimulator leads are seen.  There is no maxillofacial fracture detected.  There is a laceration along the nasal bridge soft tissues.  The paranasal sinuses are clear.  Degenerative changes of the temporomandibular joints.                                       X-Ray Hand 3 View Right (Final result)  Result time 03/18/24 16:35:47   Procedure changed from X-Ray Hand 2 View Right     Final result by Jaswant Garvey MD (03/18/24 16:35:47)                   Impression:      No acute findings      Electronically signed by: Jaswant Garvey  Date:    03/18/2024  Time:    16:35               Narrative:    EXAMINATION:  XR HAND COMPLETE 3 VIEW RIGHT    CLINICAL HISTORY:  fall, right hand pain; Pain, unspecified    TECHNIQUE:  PA, lateral, and oblique views of the right hand were performed.    COMPARISON:  None    FINDINGS:  No fracture.  No dislocation.  Diffuse degenerative changes are seen.  Soft tissues are unremarkable.                                       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  No fracture.  No dislocation.      Electronically signed by: Jaswant Garvey  Date:    03/18/2024  Time:    16:34               Narrative:    EXAMINATION:  XR KNEE 1 OR 2 VIEW LEFT    CLINICAL HISTORY:  fall  left knee pain; Pain, unspecified    TECHNIQUE:  One or two views of the left knee were performed.    COMPARISON:  None    FINDINGS:  There is no fracture or dislocation.  Mild tricompartmental osteoarthritic changes are seen.  There may be a small joint effusion.                                    X-Rays:   Independently Interpreted Readings:   Other Readings:  Reading Physician Reading Date Result Priority  Jaswant Garvey MD  [phone] 3/18/2024 STAT    Narrative & Impression  EXAMINATION:  XR HAND COMPLETE 3 VIEW RIGHT     CLINICAL HISTORY:  fall, right hand pain; Pain, unspecified     TECHNIQUE:  PA, lateral, and oblique views of the right hand were performed.     COMPARISON:  None     FINDINGS:  No fracture.  No dislocation.  Diffuse degenerative changes are seen.  Soft tissues are unremarkable.     Impression:     No acute findings        Electronically signed by: Jaswant Garvey  Date:                                            03/18/2024  Time:                                           16:35  Reading Physician Reading Date Result Priority  Jaswant Garvey MD  [phone] 3/18/2024 STAT    Narrative & Impression  EXAMINATION:  XR KNEE 1 OR 2 VIEW LEFT     CLINICAL HISTORY:  fall  left knee pain; Pain, unspecified     TECHNIQUE:  One or two views of the left knee were performed.     COMPARISON:  None     FINDINGS:  There is no fracture or dislocation.  Mild tricompartmental osteoarthritic changes are seen.  There may be a small joint effusion.     Impression:      Tricompartmental osteoarthritic changes and suspected small joint effusion.  No fracture.  No dislocation.        Electronically signed by: Jaswant Garvey  Date:                                            03/18/2024  Time:                                           16:34        Exam Ended: 03/18/24 16:32 CDT              Exam Ended: 03/18/24 16:33 CDT    Reading Physician Reading Date Result Priority  Jaswant Garvey MD  [phone] 3/18/2024 STAT    Narrative & Impression  EXAMINATION:  CT MAXILLOFACIAL WITHOUT CONTRAST     CLINICAL HISTORY:  Maxillofacial pain;s/p fall;     TECHNIQUE:  Low dose axial images, sagittal and coronal reformations were obtained through the face.  Contrast was not administered.     The CT examination was performed using one or more of the following dose reduction techniques: Automated exposure control, adjustment of the mA and kV according to patient's size, use of acute or iterative reconstruction techniques.     COMPARISON:  None     FINDINGS:  Partially imaged deep brain stimulator leads are seen.  There is no maxillofacial fracture detected.  There is a laceration along the nasal bridge soft tissues.  The paranasal sinuses are clear.  Degenerative changes of the temporomandibular joints.     Impression:     No acute maxillofacial fracture.  Soft tissue laceration at the nasal bridge.        Electronically signed by: Jaswant Garvey  Date:                                            03/18/2024  Time:                                           16:45          Medications   LIDOcaine (PF) 10 mg/ml (1%) injection 50 mg (50 mg Infiltration Given 3/18/24 1613)   Tdap (BOOSTRIX) vaccine injection 0.5 mL (0.5 mLs Intramuscular Given 3/18/24 1613)   neomycin-bacitracnZn-polymyxnB packet (1 each Topical (Top) Given 3/18/24 1648)     Medical Decision Making  Abebe Kwok has no evidence of retained foreign body; nerve, tendon, or vascular injury; fracture; compartment syndrome; or infection.    After  evaluating all of the data points in this case, the presentation of Abebe Kwok is NOT consistent with with emergent intracranial pathology, including hemorrhage, epidural, subdural, or other intra-cranial bleeding.    Similarly, the presentation of Abebe Kwok is not consistent with fracture, other head or neck injury, septal hematoma, or other emergent injury. Abebe Kwok meets low risk criteria for head trauma and risk/benifits of additional studies and outpatient observation were discussed.    Data Reviewed/Counseling: I have reviwed the patient's vital signs, nursing notes, and other relevant tests/information. I had a detailed discussion regarding the historical points, exam findings, and any diagnostic results supporting the discharge diagnosis. I also discussed the need for outpatient follow-up and the need to return to the ED if symptoms worsen or if there are any questions or concerns that arise at home.      Amount and/or Complexity of Data Reviewed  Radiology: ordered.    Risk  OTC drugs.  Prescription drug management.               ED Course as of 03/18/24 1651   Mon Mar 18, 2024   1645 CT and Xray images/report reviewed by me and discussed with patient.Discharge instructions given along with strict return precautions, patient verbalizes understanding.   [AC]      ED Course User Index  [AC] Marcin Mayfield FNP                           Clinical Impression:   Final diagnoses:  [R52] Pain  [R52] Pain - s/p fall  [S01.81XA] Facial laceration, initial encounter (Primary)  [S02.2XXA] Closed fracture of nasal bone, initial encounter        ED Disposition Condition    Discharge Stable          ED Prescriptions    None       Follow-up Information       Follow up With Specialties Details Why Contact Info    local pcp  In 5 days For suture removal              Marcin Mayfield FNP  03/18/24 1651

## 2024-03-18 NOTE — ED TRIAGE NOTES
Arrives to ED via POV with son in law present. Son in law states he got a call around @1415 stating that patient fell. He left work and went straight home to check on pt. Pt w visible nose laceration. Bleeding controlled w pressure.   VSS, NAD.   Past med hx: parkinsons, neuro stimulator, factor X blood disorder.

## 2024-12-27 ENCOUNTER — HOSPITAL ENCOUNTER (OUTPATIENT)
Dept: RADIOLOGY | Facility: HOSPITAL | Age: 74
Discharge: HOME | End: 2024-12-27
Attending: NURSE PRACTITIONER
Payer: MEDICARE

## 2024-12-27 DIAGNOSIS — R05.9 COUGH: ICD-10-CM

## 2024-12-27 PROCEDURE — 71046 X-RAY EXAM CHEST 2 VIEWS: CPT | Mod: TC

## 2024-12-27 PROCEDURE — 71046 X-RAY EXAM CHEST 2 VIEWS: CPT | Mod: 26,,, | Performed by: RADIOLOGY
